# Patient Record
Sex: MALE | Race: ASIAN | NOT HISPANIC OR LATINO | URBAN - METROPOLITAN AREA
[De-identification: names, ages, dates, MRNs, and addresses within clinical notes are randomized per-mention and may not be internally consistent; named-entity substitution may affect disease eponyms.]

---

## 2017-04-03 ENCOUNTER — OFFICE VISIT - HEALTHEAST (OUTPATIENT)
Dept: INTERNAL MEDICINE | Facility: CLINIC | Age: 67
End: 2017-04-03

## 2017-04-03 DIAGNOSIS — Q45.3 PANCREATIC ABNORMALITY: ICD-10-CM

## 2017-04-03 DIAGNOSIS — R10.13 EPIGASTRIC PAIN: ICD-10-CM

## 2017-04-03 RX ORDER — ACETAMINOPHEN 500 MG
500 TABLET ORAL EVERY 6 HOURS PRN
Status: SHIPPED | COMMUNITY
Start: 2017-04-03

## 2017-04-03 RX ORDER — TOLTERODINE 4 MG/1
4 CAPSULE, EXTENDED RELEASE ORAL DAILY
Status: SHIPPED | COMMUNITY
Start: 2017-04-03

## 2017-04-03 ASSESSMENT — MIFFLIN-ST. JEOR: SCORE: 1236.4

## 2017-04-05 ENCOUNTER — COMMUNICATION - HEALTHEAST (OUTPATIENT)
Dept: SCHEDULING | Facility: CLINIC | Age: 67
End: 2017-04-05

## 2017-04-06 ENCOUNTER — RECORDS - HEALTHEAST (OUTPATIENT)
Dept: ADMINISTRATIVE | Facility: OTHER | Age: 67
End: 2017-04-06

## 2017-05-04 ENCOUNTER — COMMUNICATION - HEALTHEAST (OUTPATIENT)
Dept: INTERNAL MEDICINE | Facility: CLINIC | Age: 67
End: 2017-05-04

## 2021-05-29 ENCOUNTER — RECORDS - HEALTHEAST (OUTPATIENT)
Dept: ADMINISTRATIVE | Facility: CLINIC | Age: 71
End: 2021-05-29

## 2021-05-30 VITALS — BODY MASS INDEX: 23.21 KG/M2 | WEIGHT: 131 LBS | HEIGHT: 63 IN

## 2021-06-09 NOTE — PROGRESS NOTES
[facility] Follow Up Note    Assessment/Plan:  1. Epigastric pain    Most likely secondary to gastritis versus ulcer.  Patient currently reports resolution of abdominal pain and black stools.  Discussed for treatment she should stay on Prilosec twice a day for months and that was reordered.  Discussed to stop all NSAIDs and smoking and alcohol.  Since he has never had endoscopy done I did recommend that he has a done but patient wants to delay it for later.  I did recommend that she sees gastroenterologist and he'll schedule this appointment.  She had colonoscopy done in 2014 which showed several polyps and repeat colonoscopy was recommended in 5 years.  We will check his hemoglobin levels today.  If there is any drop in hemoglobin and would encourage earlier endoscopy.  - omeprazole (PRILOSEC) 20 MG capsule; Take 1 capsule (20 mg total) by mouth 2 (two) times a day before meals.  Dispense: 60 capsule; Refill: 1  - Ambulatory referral to Gastroenterology  - HM1(CBC and Differential)  - HM1 (CBC with Diff)    2. Pancreatic abnormality  Nonspecific lesion in his pancreas found incidentally on CAT scan.  I did not believe it was causing his symptoms.  Discussed recommendation to have MRI done this contrast and that was ordered.  Patient will further follow up on that with gastroenterologist.  - Ambulatory referral to Gastroenterology  - MR Abdomen With Without Contrast; Future    3.  Chronic pain in the left flank.  Recent urinalysis did not show any hematuria.  CT scan did not show any renal abnormality.    Recommended that patient sees gastroenterologist in the near future and follows up with his regular primary care provider in a month.    Steff Hammond MD    Chief Complaint:  Chief Complaint   Patient presents with     Hospital Visit Follow Up     abdominal pain       History of Present Illness:  Kamilla is a 66 y.o. male with history of acid reflux, chronic low back pain (and chronic NSAID use),  hearing loss and BPH and hemorrhoids who is currently here for a follow-up from emergency room.  Patient is planning to only come here for one-time visit and subsequently go back to his primary care clinic.    Patient was seen in the emergency room several days ago for severe epigastric abdominal pain.  Pain started several days prior and got worse.  Patient also has been experiencing black tarry stools.  In the emergency room his blood work showed normal hemoglobin and lipase and liver function tests.  Urinalysis was negative for blood.  He had CAT scan of the abdomen done which showed stomach thickening concerning for gastritis versus ulcer and also an indeterminate 12 mm pancreatic lesion.  MRI of his contrast of the pancreas was recommended.  Patient was giving omeprazole 20 mg once a day and Tylenol for pain which she has been using.  Currently she is abdominal pain has resolved and she denies any recurrent tarry stools.  She is still taking ibuprofen.  His last colonoscopy was done in 2014 which showed several polyps.  Patient has never had endoscopy done.  CT scan findings as above.  Giving his age he did recommend that he has endoscopy done but patient wanted to delay it for now.  I strongly encouraged him to get established with gastroenterologist and referral was provided.  Meanwhile patient did wish to proceed with MRI of his pancreas and this was ordered.  We discussed that MRI results and endoscopy procedure can further be reviewed by gastroenterologist.  Patient does smoke and occasionally drinks alcohol and I recommended that he stops both.  We discussed not to take any NSAIDs.    Review of Systems:  A comprehensive review of systems was performed and was otherwise negative.  Denies any shortness of breath, no dizziness nausea or vomiting.      PFSH:  Social History: Reviewed  History   Smoking Status     Current Every Day Smoker     Types: Cigarettes   Smokeless Tobacco     Not on file     Social  "History     Social History Narrative     No narrative on file       Past History: Reviewed  Current Outpatient Prescriptions   Medication Sig Dispense Refill     acetaminophen (TYLENOL) 500 MG tablet Take 500 mg by mouth every 6 (six) hours as needed for pain.       tolterodine (DETROL LA) 4 MG ER capsule Take 4 mg by mouth daily.       omeprazole (PRILOSEC) 20 MG capsule Take 1 capsule (20 mg total) by mouth 2 (two) times a day before meals. 60 capsule 1     No current facility-administered medications for this visit.        Physical Exam:    Vitals:    04/03/17 1200   BP: 138/88   Patient Site: Left Arm   Patient Position: Sitting   Cuff Size: Adult Regular   Pulse: 80   Weight: 131 lb (59.4 kg)   Height: 5' 2.5\" (1.588 m)     Wt Readings from Last 3 Encounters:   04/03/17 131 lb (59.4 kg)     Body mass index is 23.58 kg/(m^2).    Constitutional:  Reveals a pleasant male, here with .  Vitals:  Per nursing notes.  HEENT:No cervical LAD, no thyromegaly,  conjunctiva is pink, no scleral icterus,  oropharynx is clear, no exudates, patient has hearing aids bilaterally  Cardiac:  Regular rate and rhythm,no murmurs, rubs, or gallops.  Legs without edema. Palpation of the radial pulse regular.  Lungs: Clear to auscultation bl.  Respiratory effort normal.  Abdomen:positive BS, soft, nontender, nondistended.  No hepato-splenomagaly  Skin:   Without rash, bruise, or palpable lesions.  Psychiatric: affect appropriate, memory intact.     Data Review:    Analysis and Summary of Old Records (2): Yes care everywhere    Records Requested (1): No      Other History Summarized (from other people in the room) (2): No    Radiology Tests Summarized (XRAY/CT/MRI/DXA) (1): Yes CT scan of abdomen    Labs Reviewed (1): Yes    Medicine Tests Reviewed (EKG/ECHO/COLONOSCOPY/EGD) (1): Yes colonoscopy    Independent Review of EKG or X-RAY (2): No    "

## 2021-06-15 PROBLEM — R10.13 EPIGASTRIC PAIN: Status: ACTIVE | Noted: 2017-04-03
